# Patient Record
Sex: MALE | Race: WHITE
[De-identification: names, ages, dates, MRNs, and addresses within clinical notes are randomized per-mention and may not be internally consistent; named-entity substitution may affect disease eponyms.]

---

## 2022-10-28 ENCOUNTER — HOSPITAL ENCOUNTER (EMERGENCY)
Dept: HOSPITAL 97 - ER | Age: 72
Discharge: HOME | End: 2022-10-28
Payer: COMMERCIAL

## 2022-10-28 VITALS — TEMPERATURE: 98.5 F | SYSTOLIC BLOOD PRESSURE: 155 MMHG | DIASTOLIC BLOOD PRESSURE: 78 MMHG | OXYGEN SATURATION: 100 %

## 2022-10-28 DIAGNOSIS — I10: ICD-10-CM

## 2022-10-28 DIAGNOSIS — R42: ICD-10-CM

## 2022-10-28 DIAGNOSIS — R55: Primary | ICD-10-CM

## 2022-10-28 LAB
ALBUMIN SERPL BCP-MCNC: 3.3 G/DL (ref 3.4–5)
ALP SERPL-CCNC: 65 U/L (ref 45–117)
ALT SERPL W P-5'-P-CCNC: 24 U/L (ref 12–78)
AST SERPL W P-5'-P-CCNC: 13 U/L (ref 15–37)
BUN BLD-MCNC: 13 MG/DL (ref 7–18)
GLUCOSE SERPLBLD-MCNC: 98 MG/DL (ref 74–106)
HCT VFR BLD CALC: 43.1 % (ref 39.6–49)
INR BLD: 1.17
LYMPHOCYTES # SPEC AUTO: 0.7 K/UL (ref 0.7–4.9)
MAGNESIUM SERPL-MCNC: 2.1 MG/DL (ref 1.8–2.4)
MCV RBC: 85.2 FL (ref 80–100)
PMV BLD: 7.9 FL (ref 7.6–11.3)
POTASSIUM SERPL-SCNC: 3.6 MMOL/L (ref 3.5–5.1)
RBC # BLD: 5.06 M/UL (ref 4.33–5.43)
TROPONIN I SERPL HS-MCNC: 11.4 PG/ML (ref ?–58.9)

## 2022-10-28 PROCEDURE — 85025 COMPLETE CBC W/AUTO DIFF WBC: CPT

## 2022-10-28 PROCEDURE — 85730 THROMBOPLASTIN TIME PARTIAL: CPT

## 2022-10-28 PROCEDURE — 80076 HEPATIC FUNCTION PANEL: CPT

## 2022-10-28 PROCEDURE — 85610 PROTHROMBIN TIME: CPT

## 2022-10-28 PROCEDURE — 93005 ELECTROCARDIOGRAM TRACING: CPT

## 2022-10-28 PROCEDURE — 81003 URINALYSIS AUTO W/O SCOPE: CPT

## 2022-10-28 PROCEDURE — 84484 ASSAY OF TROPONIN QUANT: CPT

## 2022-10-28 PROCEDURE — 82550 ASSAY OF CK (CPK): CPT

## 2022-10-28 PROCEDURE — 70450 CT HEAD/BRAIN W/O DYE: CPT

## 2022-10-28 PROCEDURE — 80048 BASIC METABOLIC PNL TOTAL CA: CPT

## 2022-10-28 PROCEDURE — 83735 ASSAY OF MAGNESIUM: CPT

## 2022-10-28 PROCEDURE — 99284 EMERGENCY DEPT VISIT MOD MDM: CPT

## 2022-10-28 PROCEDURE — 36415 COLL VENOUS BLD VENIPUNCTURE: CPT

## 2022-10-28 NOTE — EDPHYS
Physician Documentation                                                                           

 [facility]                                                                 

Name: Suman Arnold                                                                                

Age: 71 yrs                                                                                       

Sex: Male                                                                                         

: 1950                                                                                   

MRN: [id number]                                                                                   

Arrival Date: 10/28/2022                                                                          

Time: 14:41                                                                                       

Account#: [id number]                                                                            

Bed 13                                                                                            

Private MD:                                                                                       

ED Physician Jevon Birmingham                                                                         

HPI:                                                                                              

10/28                                                                                             

15:15 This 71 yrs old Male presents to ER via [facility] with complaints of Near Syncope.     ms3 

15:15 The patient has experienced near-syncope, felt dizzy. Onset: The symptoms/episode       ms3 

      began/occurred just prior to arrival. Duration: This was a single episode. Associated       

      injury: The patient did not suffer any apparent associated injury. Associated signs and     

      symptoms: Pertinent positives: dizziness, Pertinent negatives: abdominal pain, chest        

      pain. Current symptoms: Currently, the patient is not experiencing any symptoms.            

                                                                                                  

Historical:                                                                                       

- Allergies:                                                                                      

15:17 No Known Allergies;                                                                     db  

- Home Meds:                                                                                      

15:17 simvastatin 40 mg Oral tab [Active]; esomeprazole magnesium 40 mg oral TbEC [Active];   db  

      olmesartan-hydrochlorothiazide 20-12.5 mg oral tab [Active];                                

- PMHx:                                                                                           

15:17 Hypertensive disorder;                                                                  db  

                                                                                                  

- Immunization history:: Adult Immunizations unknown, Client reports having NOT                   

  received the Covid vaccine.                                                                     

- Social history:: Smoking status: Patient denies any tobacco usage or history of.                

                                                                                                  

                                                                                                  

ROS:                                                                                              

15:15 Constitutional: Negative for fever, and chills. Neck: Negative for injury, pain, and    ms3 

      swelling, Cardiovascular: Negative for chest pain, and palpitations. Respiratory:           

      Negative for shortness of breath, cough, wheezing, and pleuritic chest pain,                

      MS/Extremity: Negative for injury and deformity, Skin: Negative for injury, rash, and       

      discoloration.                                                                              

15:15 Neuro: Positive for near syncope.                                                           

15:15 All other systems are negative.                                                             

                                                                                                  

Exam:                                                                                             

15:15 Constitutional:  This is a well developed, well nourished patient who is awake, alert,  ms3 

      and in no acute distress. Head/Face:  Normocephalic, atraumatic. Neck:  Trachea             

      midline, no cervical lymphadenopathy.  Supple, full range of motion without nuchal          

      rigidity, or vertebral point tenderness.  No Meningismus. Chest/axilla:  Normal chest       

      wall appearance and motion.  Nontender with no deformity.   Cardiovascular:  Regular        

      rate and rhythm with a normal S1 and S2.  No gallops, murmurs, or rubs.  Normal PMI, no     

      JVD.  No pulse deficits. Respiratory:  Lungs have equal breath sounds bilaterally,          

      clear to auscultation and percussion.  No rales, rhonchi or wheezes noted.  No              

      increased work of breathing, no retractions or nasal flaring. Abdomen/GI:  Soft,            

      non-tender, with normal bowel sounds.  No distension or tympany.  No guarding or            

      rebound.  No evidence of tenderness throughout. Back:  No spinal tenderness.  No            

      costovertebral tenderness.  Full range of motion. Skin:  Warm, dry with normal turgor.      

      Normal color with no rashes, no lesions, and no evidence of cellulitis. Neuro:  Awake       

      and alert, GCS 15, oriented to person, place, time, and situation.  Cranial nerves          

      II-XII grossly intact.  Motor strength 5/5 in all extremities.  Sensory grossly intact.     

       Cerebellar exam normal.  Normal gait.                                                      

16:07 ECG was reviewed by the Attending Physician.                                            ms3 

                                                                                                  

Vital Signs:                                                                                      

15:00  / 83; Pulse 71; Resp 14; Temp 98.7(O); Pulse Ox 100% ; Weight 90.72 kg; Height 6 db  

      ft. (182.88 cm); Pain 0/10;                                                                 

16:00  / 80; Pulse 77; Resp 18; Pulse Ox 96% ; Pain 0/10;                               db  

17:00  / 83; Pulse 77; Resp 18; Pulse Ox 97% ;                                          db  

17:45  / 85; Pulse 79; Resp 18; Pulse Ox 96% ; Pain 7/10;                               db  

18:30  / 78; Pulse 86; Resp 16; Temp 98.5; Pulse Ox 100% on R/A;                        db  

15:00 Body Mass Index 27.12 (90.72 kg, 182.88 cm)                                             db  

                                                                                                  

MDM:                                                                                              

14:50 Patient medically screened.                                                             ms3 

15:15 Differential Diagnosis: cardiac arrhythmia, idiopathic syncope, anxiety.                ms3 

18:14 Data reviewed: vital signs, nurses notes, lab test result(s), EKG, radiologic studies,  ms3 

      and as a result, I will discharge patient. Data interpreted: Cardiac monitor: rate is       

      80 beats/min, rhythm is normal sinus rhythm, regular, with no ectopy, Interpretation:       

      normal rate, normal rhythm. Counseling: I had a detailed discussion with the patient        

      and/or guardian regarding: the historical points, exam findings, and any diagnostic         

      results supporting the discharge/admit diagnosis, lab results, radiology results, the       

      need for outpatient follow up, to return to the emergency department if symptoms worsen     

      or persist or if there are any questions or concerns that arise at home. ED course:         

      Discussed labs, EKG, CT head with patient and his son. Discussed observation with           

      patient patient declines. All questions were answered. Return precautions discussed         

      include worsening symptoms, headache, weakness, numbness, chest pain, shortness of          

      breath, or any other concerns. Patient understands and agrees with plan. On                 

      reevaluation patient is alert and oriented x4, in no apparent distress, nontoxic,           

      asymptomatic.                                                                               

                                                                                                  

10/28                                                                                             

14:50 Order name: Basic Metabolic Panel; Complete Time: 17:49                                 ms3 

10/28                                                                                             

14:50 Order name: CBC with Diff; Complete Time: 17:49                                         ms3 

10/28                                                                                             

14:50 Order name: CPK; Complete Time: 17:49                                                   ms3 

10/28                                                                                             

14:50 Order name: Hepatic Function; Complete Time: 17:49                                      ms3 

10/28                                                                                             

14:50 Order name: Magnesium; Complete Time: 17:49                                             ms3 

10/28                                                                                             

14:50 Order name: Protime (+inr); Complete Time: 17:49                                        ms3 

10/28                                                                                             

14:50 Order name: Ptt, Activated; Complete Time: 17:49                                        ms3 

10/28                                                                                             

14:50 Order name: Troponin High Sensitivity; Complete Time: 17:49                             ms3 

10/28                                                                                             

14:50 Order name: CT Head Brain wo Cont; Complete Time: 16:05                                 ms3 

10/28                                                                                             

14:50 Order name: EKG; Complete Time: 14:52                                                   ms3 

10/28                                                                                             

14:50 Order name: Cardiac monitoring; Complete Time: 16:16                                    ms3 

10/28                                                                                             

14:50 Order name: EKG - Nurse/Tech; Complete Time: 16:16                                      ms3 

10/28                                                                                             

17:01 Order name: Urine Dipstick-Ancillary; Complete Time: 17:49                              EDMS

10/28                                                                                             

14:50 Order name: IV Saline Lock; Complete Time: 16:16                                        ms3 

10/28                                                                                             

14:50 Order name: Labs collected and sent; Complete Time: 16:17                               ms3 

10/28                                                                                             

14:50 Order name: NPO; Complete Time: 16:17                                                   ms3 

10/28                                                                                             

14:50 Order name: O2 Per Protocol; Complete Time: 16:17                                       ms3 

10/28                                                                                             

14:50 Order name: O2 Sat Monitoring; Complete Time: 16:17                                     ms3 

10/28                                                                                             

14:50 Order name: Urine Dipstick-Ancillary (obtain specimen); Complete Time: 17:21            ms3 

                                                                                                  

EC:07 Rate is 74 beats/min. Rhythm is regular. QRS Axis is Normal. QT interval is normal.     ms3 

      Clinical impression: Normal ECG and with preventricular contractions. Interpreted by        

      me. Reviewed by me.                                                                         

                                                                                                  

Administered Medications:                                                                         

18:20 Drug: Tylenol 1000 mg Route: PO;                                                        db  

18:28 Follow up: Response: No adverse reaction                                                db  

                                                                                                  

                                                                                                  

Disposition Summary:                                                                              

10/28/22 17:59                                                                                    

Discharge Ordered                                                                                 

      Location: Home                                                                          ms3 

      Condition: Stable                                                                       ms3 

      Diagnosis                                                                                   

        - Syncope Near                                                                        ms3 

      Followup:                                                                               ms3 

        - With: Juvencio Willett DO                                                                  

        - When: 2 - 3 days                                                                         

        - Reason: Recheck today's complaints                                                       

      Discharge Instructions:                                                                     

        - Discharge Summary Sheet                                                             ms3 

        - Near-Syncope                                                                        ms3 

      Forms:                                                                                      

        - Medication Reconciliation Form                                                      ms3 

        - Thank You Letter                                                                    ms3 

        - Antibiotic Education                                                                ms3 

        - Prescription Opioid Use                                                             ms3 

Signatures:                                                                                       

Dispatcher [employer]                           EDMS                                                 

Jevon Birmingham DO                        DO   ms3                                                  

Jessica Shankar, RN                    RN   db                                                   

                                                                                                  

**************************************************************************************************

## 2022-10-28 NOTE — RAD REPORT
EXAM DESCRIPTION:  CT - Head Brain Wo Cont - 10/28/2022 3:31 pm

 

CLINICAL HISTORY:  Near syncope

 

COMPARISON:  No comparisons

 

TECHNIQUE:  All CT scans are performed using dose optimization technique as appropriate and may inclu
de automated exposure control or mA/KV adjustment according to patient size.

 

FINDINGS:  No intracranial hemorrhage, hydrocephalus or extra-axial fluid collection.No areas of brai
n edema or evidence of midline shift.

 

The paranasal sinuses and mastoids are clear. The calvarium is intact.

 

IMPRESSION:  No acute intracranial abnormality.

## 2022-10-28 NOTE — XMS REPORT
Continuity of Care Document

                           Created on:2022



Patient:BINTA SULLIVAN

Sex:Male

:1950

External Reference #:[id number]





Demographics







                          Address                   [address]



                                                    [place] [address]

 

                          Home Phone                [phone]

 

                          Email Address             [email or web address]

 

                          Preferred Language        English

 

                          Marital Status            Unknown

 

                          Rastafarian Affiliation     Unknown

 

                          Race                      Unknown

 

                          Additional Race(s)        Unavailable



                                                    White

 

                          Ethnic Group              Unknown









Author







                          Organization              [facility]

t

 

                          Address                   [facility] [address]



                                                    [place] [address]

 

                          Phone                     [phone]









Support







                Name            Relationship    Address         Phone

 

                Kirsten Sullivan Spouse          [address]     [phone]



                                                [place] [address] 

 

                OTHER, ENTER NAME IN Primary Care Physician Unavailable     Unav

ailable



                NOTES                                           

 

                MD GONSALO WEISS Emergency Provider Unavailable     Unavailable

 

                NATELAKEISHA  Unavailable     [address]     Unavailable



                                                [place] [address] 

 

                MD GONZÁLEZ [facility] Emergency Provider [address]  [phone]



                [place] [address] 









Care Team Providers







                    Name                Role                Phone

 

                    Lab, Adc Fam Pob I  Attending Clinician Unavailable

 

                    Bonita Guajardo  Attending Clinician [phone]

 

                    Doctor Unassigned, No Name Attending Clinician Unavailable

 

                    Ирина Hurt PA-C Attending Clinician [phone]

 

                    Unknown, Attending  Attending Clinician Unavailable









Payers







           Payer Name Policy Type Policy Number Effective Date Expiration Date S

ource







Problems

This patient has no known problems.



Allergies, Adverse Reactions, Alerts







       Allergy Allergy Status Severity Reaction(s) Onset  Inactive Treating Comm

ents 

Source



       Name   Type                        Date   Date   Clinician        

 

       NO KNOWN Drug   Active                                           [facility]



       ALLERGIE Class                                                   ity of



       [facility]







Social History







           Social Habit Start Date Stop Date  Quantity   Comments   Source

 

           Sex Assigned At                                             [facility]



           Birth                                                  Medical Branch

 

           Tobacco use and 2019 Never used            [facility]



           exposure   00:00:00   00:00:00                         [facility]









                Smoking Status  Start Date      Stop Date       Source

 

                Former smoker   2019 00:00:00 2019 00:00:00 [facility]



                                                                Branch







Medications







       Ordered Filled Start  Stop   Current Ordering Indication Dosage Frequency

 Signature

                    Comments            Components          Source



     Medication Medication Date Date Medication? Clinician                (SIG) 

          



     Name Name                                                   

 

     irais       2019- No             40mg                     Unive

rs



     ne                                                 ity of



     acetonide      04:15: 03:04                                         [place]



     (KENALOG)      00   :00                                          Medical



     injection                                                        Branch



     40 mg                                                        

 

     triamcinolo       2019- No             40mg      40 mg,           Uni

vers



     ne                                  Intramuscu           ity of



     acetonide      04:15: 03:04                          lar, ONCE,           T

exas



     (KENALOG)      00   :00                           1 dose,           Medical



     injection                                         Fri 19           Bran

ch



     40 mg                                         at 2315,           



                                                  Routine           

 

     simvastatin            Yes                      TAKE 1           Univ

ers



     40 mg      7-07                               TABLET BY           ity of



     tablet      00:00:                               MOUTH           [place]



               00                                 EVERY DAY           Medical



                                                  IN THE           Branch



                                                  EVENING           

 

     olmesartan-            Yes                      TAKE 1           Univ

ers



     hydrochloro      7-07                               TABLET BY           ity

 of



     thiazide      00:00:                               ORAL ROUTE           [place]

as



     20-12.5 mg      00                                 EVERY DAY           Medi

axel



     per tablet                                                        Branch

 

     simvastatin            Yes                      TAKE 1           Univ

ers



     40 mg      7-07                               TABLET BY           ity of



     tablet      00:00:                               MOUTH           [place]



               00                                 EVERY DAY           Medical



                                                  IN THE           Branch



                                                  EVENING           

 

     olmesartan-            Yes                      TAKE 1           Univ

ers



     hydrochloro      7-07                               TABLET BY           ity

 of



     thiazide      00:00:                               ORAL ROUTE           [place]

as



     20-12.5 mg      00                                 EVERY DAY           Medi

axel



     per tablet                                                        Branch

 

     simvastatin            Yes                      TAKE 1           Univ

ers



     40 mg      7-07                               TABLET BY           ity of



     tablet      00:00:                               MOUTH           [place]



               00                                 EVERY DAY           Medical



                                                  IN THE           Branch



                                                  EVENING           

 

     olmesartan-            Yes                      TAKE 1           Univ

ers



     hydrochloro      7-07                               TABLET BY           ity

 of



     thiazide      00:00:                               ORAL ROUTE           [facility]

as



     20-12.5 mg      00                                 EVERY DAY           Medi

axel



     per tablet                                                        Branch

 

     esomeprazol            Yes                      TAKE 1           Univ

ers



     e 40 mg      7-04                               CAPSULE           ity of



     capsule      00:00:                               (40MG) BY           [place]



               00                                 ORAL ROUTE           Medical



                                                  EVERY DAY           Branch

 

     esomeprazol            Yes                      TAKE 1           Univ

ers



     e 40 mg      7-04                               CAPSULE           ity of



     capsule      00:00:                               (40MG) BY           [place]



               00                                 ORAL ROUTE           Medical



                                                  EVERY DAY           Branch

 

     esomeprazol            Yes                      TAKE 1           Univ

ers



     e 40 mg      7-04                               CAPSULE           ity of



     capsule      00:00:                               (40MG) BY           [place]



               00                                 ORAL ROUTE           Medical



                                                  EVERY DAY           Branch







Vital Signs







             Vital Name   Observation Time Observation Value Comments     Source

 

             Systolic blood 2019 02:56:00 143 mm[Hg]                Univer

sity of



             pressure                                            [facility]



                                                                 Branch

 

             Diastolic blood 2019 02:56:00 81 mm[Hg]                 Unive

rsity of



             pressure                                            [facility]



                                                                 Branch

 

             Heart rate   2019 02:54:00 62 /min                   Universi

ty of



                                                                 [facility]



                                                                 Branch

 

             Body temperature 2019 02:54:00 36.56 Lori                 Univ

ersity of



                                                                 [facility]



                                                                 Branch

 

             Respiratory rate 2019 02:54:00 16 /min                   Univ

ersity of



                                                                 [facility]



                                                                 Branch

 

             Body height  2019 02:54:00 182.9 cm                  Universi

ty of



                                                                 [facility]



                                                                 Branch

 

             Body weight  2019 02:54:00 93.169 kg                 Universi

ty of



                                                                 [facility]



                                                                 Branch

 

             BMI          2019 02:54:00 27.86 kg/m2               Universi

ty of



                                                                 [facility]



                                                                 Branch

 

             Oxygen saturation in 2019 02:54:00 98 /min                   

[facility]



             Arterial blood by                                        [place] [facility]

axel



             Pulse oximetry                                        Branch

 

             Systolic blood 2019 02:56:00 143 mm[Hg]                Univer

sity of



             pressure                                            [facility]



                                                                 Branch

 

             Diastolic blood 2019 02:56:00 81 mm[Hg]                 Unive

rsity of



             pressure                                            [facility]



                                                                 Branch

 

             Heart rate   2019 02:54:00 62 /min                   Universi

ty of



                                                                 [facility]



                                                                 Branch

 

             Body temperature 2019 02:54:00 36.56 Lori                 Univ

ersity of



                                                                 [facility]



                                                                 Branch

 

             Respiratory rate 2019 02:54:00 16 /min                   Univ

ersity of



                                                                 [facility]



                                                                 Branch

 

             Body height  2019 02:54:00 182.9 cm                  Universi

ty of



                                                                 [facility]



                                                                 Branch

 

             Body weight  2019 02:54:00 93.169 kg                 Universi

ty of



                                                                 [facility]



                                                                 Branch

 

             BMI          2019 02:54:00 27.86 kg/m2               Universi

ty of



                                                                 [facility]



                                                                 Branch

 

             Oxygen saturation in 2019 02:54:00 98 /min                   

[facility]



             Arterial blood by                                        [place] [facility]

axel



             Pulse oximetry                                        Branch







Procedures

This patient has no known procedures.



Encounters







        Start   End     Encounter Admission Attending Care    Care    Encounter 

Source



        Date/Time Date/Time Type    Type    Clinicians Facility Department ID   

   

 

        2020 Laboratory         Lab, Adc Fam Pob I [facility]    1.2.

[id number] 

[facility]



        08:33:23 08:53:23 Only            Larry Bonita [facility]  [id number]    

     ity [place] [id number]         [place]

as



                                                [employer] [phone]         Me

luisal



                                                [facility] access hospital     [address]             [place]



                                                Office                  



                                                Building                 



                                                One                     

 

        2020 Outpatient R               [facility]    [id number]

[id number]



        08:20:00 08:20:00                                                 ity of



                                                                        [facility]

 

        2020 Letter          Doctor  LUDMILA    [id number]

[id number] Univers



        00:00:00 00:00:00 (Out)           Unassigned, RICHARD   [id number]       

  ity of



                                        [facility] [id number]         [place]

as



                                                        [phone]         [address]

 

        2019 [facility]          [facility]    [id number]

[id number] 



        21:50:50 22:05:50 Care            [facility]  [id number]         



                                                Surgical [id number]         



                                                Specialti [phone]         



                                                [address]                 

 

        2019 Urgent          [facility]    [id number]

[id number] [facility]



        21:50:50 22:05:50 Care            Unknown, [facility]  350.1.13.10

         ity of



                                                Surgical [id number]         [place]

as



                                                Specialti [phone]         Me

luisal



                                                      [address]             [facility]                 







Results

This patient has no known results.

## 2022-10-28 NOTE — ER
Nurse's Notes                                                                                     

 [facility]                                                                 

Name: Suman Arnold                                                                                

Age: 71 yrs                                                                                       

Sex: Male                                                                                         

: 1950                                                                                   

MRN: [id number]                                                                                   

Arrival Date: 10/28/2022                                                                          

Time: 14:41                                                                                       

Account#: [id number]                                                                            

Bed 13                                                                                            

Private MD:                                                                                       

Diagnosis: Syncope Near                                                                           

                                                                                                  

Presentation:                                                                                     

10/28                                                                                             

15:00 Chief complaint: EMS states: patient was working outside felt light headed like was     db  

      going to pass out. complained of numbness in fingers, tachypnic upon EMS arrival. 1         

      week ago took a viagra and had headache for 1 week. Coronavirus screen: Vaccine status:     

      Patient reports being unvaccinated. Client denies travel out of the [place] in the last 14     

      days. At this time, the client does not indicate any symptoms associated with               

      coronavirus-19. Ebola Screen: Patient negative for fever greater than or equal to 101.5     

      degrees Fahrenheit, and additional compatible Ebola Virus Disease symptoms Patient          

      denies exposure to infectious person. Patient denies travel to an Ebola-affected area       

      in the 21 days before illness onset. No symptoms or risks identified at this time.          

      Initial Sepsis Screen: Does the patient meet any 2 criteria? No. Patient's initial          

      sepsis screen is negative. Does the patient have a suspected source of infection? No.       

      Patient's initial sepsis screen is negative. Risk Assessment: Do you want to hurt           

      yourself or someone else? Patient reports no desire to harm self or others. Onset of        

      symptoms was 2022 at 12:00.                                                     

15:00 Acuity: BHANU 2                                                                           db  

15:13 Chief complaint: Patient states: near syncope.                                          db  

15:14 Method Of Arrival: EMS: [facility]                                                db  

                                                                                                  

Triage Assessment:                                                                                

15:17 General: Appears in no apparent distress. comfortable, Behavior is calm, cooperative,   db  

      appropriate for age, quiet. Pain: Denies pain. Cardiovascular:. Respiratory: No             

      deficits noted. Airway is patent Respiratory effort is even, unlabored.                     

                                                                                                  

Historical:                                                                                       

- Allergies:                                                                                      

15:17 No Known Allergies;                                                                     db  

- Home Meds:                                                                                      

15:17 simvastatin 40 mg Oral tab [Active]; esomeprazole magnesium 40 mg oral TbEC [Active];   db  

      olmesartan-hydrochlorothiazide 20-12.5 mg oral tab [Active];                                

- PMHx:                                                                                           

15:17 Hypertensive disorder;                                                                  db  

                                                                                                  

- Immunization history:: Adult Immunizations unknown, Client reports having NOT                   

  received the Covid vaccine.                                                                     

- Social history:: Smoking status: Patient denies any tobacco usage or history of.                

                                                                                                  

                                                                                                  

Screenin:00 Abuse screen: Denies threats or abuse. Denies injuries from another. Nutritional        db  

      screening: No deficits noted. Tuberculosis screening: No symptoms or risk factors           

      identified. Fall Risk None identified. No fall in past 12 months (0 pts). No secondary      

      diagnosis (0 pts). IV access (20 points). Ambulatory Aid- None/Bed Rest/Nurse Assist (0     

      pts). Gait- Normal/Bed Rest/Wheelchair (0 pts) Mental Status- Oriented to own ability       

      (0 pts). Total Salazar Fall Scale indicates No Risk (0-24 pts).                               

                                                                                                  

Assessment:                                                                                       

16:17 Reassessment: Patient appears in no apparent distress at this time. No changes from     db  

      previously documented assessment. Patient and/or family updated on plan of care and         

      expected duration. Pain level reassessed. Patient is alert, oriented x 3, equal             

      unlabored respirations, skin warm/dry/pink. Neuro: No deficits noted. Cardiovascular:.      

      Cardiovascular: Denies chest pain.                                                          

18:00 Reassessment: Patient appears in no apparent distress at this time. Patient and/or      db  

      family updated on plan of care and expected duration. Pain level reassessed. Patient is     

      alert, oriented x 3, equal unlabored respirations, skin warm/dry/pink. Patient states       

      feeling better. Patient states symptoms have improved. General: Appears in no apparent      

      distress. comfortable, Behavior is calm, cooperative, appropriate for age, quiet.           

                                                                                                  

Vital Signs:                                                                                      

15:00  / 83; Pulse 71; Resp 14; Temp 98.7(O); Pulse Ox 100% ; Weight 90.72 kg; Height 6 db  

      ft. (182.88 cm); Pain 0/10;                                                                 

16:00  / 80; Pulse 77; Resp 18; Pulse Ox 96% ; Pain 0/10;                               db  

17:00  / 83; Pulse 77; Resp 18; Pulse Ox 97% ;                                          db  

17:45  / 85; Pulse 79; Resp 18; Pulse Ox 96% ; Pain 7/10;                               db  

18:30  / 78; Pulse 86; Resp 16; Temp 98.5; Pulse Ox 100% on R/A;                        db  

15:00 Body Mass Index 27.12 (90.72 kg, 182.88 cm)                                             db  

                                                                                                  

ED Course:                                                                                        

14:41 Patient arrived in ED.                                                                  eb  

14:46 Xander Vazquez PA is PHCP.                                                                cp  

14:46 Jevon Birmingham DO is Attending Physician.                                                cp  

15:00 Maintain EMS IV. Dressing intact. Good blood return noted. Site clean \T\ dry. Gauge \T\    db

      site: 20 G left AC.                                                                         

15:13 Jessica Shankar, RN is Primary Nurse.                                                  db  

15:17 Triage completed.                                                                       db  

15:32 CT Head Brain wo Cont In Process Unspecified.                                           EDMS

16:00 Patient has correct armband on for positive identification. Bed in low position. Call   db  

      light in reach. Side rails up X 1.                                                          

17:00 Urine collected: clean catch specimen, sruthi colored.                                   tm3 

17:58 Juvencio Willett DO is Referral Physician.                                                ms3 

18:00 No provider procedures requiring assistance completed. IV discontinued.                 db  

18:00 Arm band placed on right wrist.                                                         db  

                                                                                                  

Administered Medications:                                                                         

18:20 Drug: Tylenol 1000 mg Route: PO;                                                        db  

18:28 Follow up: Response: No adverse reaction                                                db  

                                                                                                  

                                                                                                  

Medication:                                                                                       

16:00 VIS not applicable for this client.                                                     db  

                                                                                                  

Outcome:                                                                                          

17:59 Discharge ordered by MD.                                                                ms3 

18:00 Discharged to home via wheelchair, with family.                                         db  

18:00 Condition: stable                                                                           

18:00 Discharge instructions given to patient, Instructed on discharge instructions, follow       

      up and referral plans.                                                                      

18:31 Patient left the ED.                                                                    db  

                                                                                                  

Signatures:                                                                                       

Dispatcher [employer]                           EDMS                                                 

Denver Hampton                                tm3                                                  

Xander Vazquez PA PA cp Botello, Elizabeth                           eb                                                   

Jevon Birmingham DO                        DO   ms3                                                  

Jessica Shankar, RN                    RN   db                                                   

                                                                                                  

**************************************************************************************************

## 2022-10-31 NOTE — EKG
Test Date:    2022-10-28               Test Time:    16:07:42

Technician:   PENG                                    

                                                     

MEASUREMENT RESULTS:                                       

Intervals:                                           

Rate:         74                                     

ID:           [id number]                                    

QRSD:         112                                    

QT:           370                                    

QTc:          410                                    

Axis:                                                

P:            68                                     

ID:           [id number]                                    

QRS:          46                                     

T:            192                                    

                                                     

INTERPRETIVE STATEMENTS:                                       

                                                     

Sinus rhythm with occasional premature ventricular complexes

Septal infarct, age undetermined

Abnormal ECG

Compared to ECG 12/21/2007 19:54:48

Ventricular premature complex(es) now present

Myocardial infarct finding now present

T-wave abnormality no longer present



Electronically Signed On 10-31-22 16:00:15 CDT by Harris Freeman